# Patient Record
Sex: MALE | Race: WHITE | NOT HISPANIC OR LATINO | URBAN - METROPOLITAN AREA
[De-identification: names, ages, dates, MRNs, and addresses within clinical notes are randomized per-mention and may not be internally consistent; named-entity substitution may affect disease eponyms.]

---

## 2019-07-18 PROBLEM — Z00.00 ENCOUNTER FOR PREVENTIVE HEALTH EXAMINATION: Status: ACTIVE | Noted: 2019-07-18

## 2021-04-25 ENCOUNTER — EMERGENCY (EMERGENCY)
Facility: HOSPITAL | Age: 71
LOS: 1 days | Discharge: HOME | End: 2021-04-25
Attending: STUDENT IN AN ORGANIZED HEALTH CARE EDUCATION/TRAINING PROGRAM
Payer: MEDICARE

## 2021-04-25 VITALS — HEIGHT: 69 IN | WEIGHT: 229.94 LBS

## 2021-04-25 LAB
ALBUMIN SERPL ELPH-MCNC: 4.3 G/DL — SIGNIFICANT CHANGE UP (ref 3.3–5.2)
ALP SERPL-CCNC: 68 U/L — SIGNIFICANT CHANGE UP (ref 40–120)
ALT FLD-CCNC: 82 U/L — HIGH
ANION GAP SERPL CALC-SCNC: 17 MMOL/L — SIGNIFICANT CHANGE UP (ref 5–17)
APTT BLD: 20.7 SEC — LOW (ref 27.5–35.5)
AST SERPL-CCNC: 58 U/L — HIGH
BASOPHILS # BLD AUTO: 0.07 K/UL — SIGNIFICANT CHANGE UP (ref 0–0.2)
BASOPHILS NFR BLD AUTO: 0.9 % — SIGNIFICANT CHANGE UP (ref 0–2)
BILIRUB SERPL-MCNC: <0.2 MG/DL — LOW (ref 0.4–2)
BLD GP AB SCN SERPL QL: SIGNIFICANT CHANGE UP
BUN SERPL-MCNC: 23 MG/DL — HIGH (ref 8–20)
CALCIUM SERPL-MCNC: 9.4 MG/DL — SIGNIFICANT CHANGE UP (ref 8.6–10.2)
CHLORIDE SERPL-SCNC: 103 MMOL/L — SIGNIFICANT CHANGE UP (ref 98–107)
CO2 SERPL-SCNC: 22 MMOL/L — SIGNIFICANT CHANGE UP (ref 22–29)
CREAT SERPL-MCNC: 0.93 MG/DL — SIGNIFICANT CHANGE UP (ref 0.5–1.3)
EOSINOPHIL # BLD AUTO: 0.2 K/UL — SIGNIFICANT CHANGE UP (ref 0–0.5)
EOSINOPHIL NFR BLD AUTO: 2.5 % — SIGNIFICANT CHANGE UP (ref 0–6)
ETHANOL SERPL-MCNC: 278 MG/DL — HIGH (ref 0–9)
GLUCOSE SERPL-MCNC: 117 MG/DL — HIGH (ref 70–99)
HCT VFR BLD CALC: 46.4 % — SIGNIFICANT CHANGE UP (ref 39–50)
HGB BLD-MCNC: 15.2 G/DL — SIGNIFICANT CHANGE UP (ref 13–17)
IMM GRANULOCYTES NFR BLD AUTO: 1.9 % — HIGH (ref 0–1.5)
INR BLD: 0.96 RATIO — SIGNIFICANT CHANGE UP (ref 0.88–1.16)
LACTATE BLDV-MCNC: 2.9 MMOL/L — HIGH (ref 0.5–2)
LYMPHOCYTES # BLD AUTO: 2.7 K/UL — SIGNIFICANT CHANGE UP (ref 1–3.3)
LYMPHOCYTES # BLD AUTO: 34.4 % — SIGNIFICANT CHANGE UP (ref 13–44)
MCHC RBC-ENTMCNC: 31.9 PG — SIGNIFICANT CHANGE UP (ref 27–34)
MCHC RBC-ENTMCNC: 32.8 GM/DL — SIGNIFICANT CHANGE UP (ref 32–36)
MCV RBC AUTO: 97.3 FL — SIGNIFICANT CHANGE UP (ref 80–100)
MONOCYTES # BLD AUTO: 0.91 K/UL — HIGH (ref 0–0.9)
MONOCYTES NFR BLD AUTO: 11.6 % — SIGNIFICANT CHANGE UP (ref 2–14)
NEUTROPHILS # BLD AUTO: 3.82 K/UL — SIGNIFICANT CHANGE UP (ref 1.8–7.4)
NEUTROPHILS NFR BLD AUTO: 48.7 % — SIGNIFICANT CHANGE UP (ref 43–77)
PLATELET # BLD AUTO: 244 K/UL — SIGNIFICANT CHANGE UP (ref 150–400)
POTASSIUM SERPL-MCNC: 3.8 MMOL/L — SIGNIFICANT CHANGE UP (ref 3.5–5.3)
POTASSIUM SERPL-SCNC: 3.8 MMOL/L — SIGNIFICANT CHANGE UP (ref 3.5–5.3)
PROT SERPL-MCNC: 7.8 G/DL — SIGNIFICANT CHANGE UP (ref 6.6–8.7)
PROTHROM AB SERPL-ACNC: 11.2 SEC — SIGNIFICANT CHANGE UP (ref 10.6–13.6)
RBC # BLD: 4.77 M/UL — SIGNIFICANT CHANGE UP (ref 4.2–5.8)
RBC # FLD: 13.2 % — SIGNIFICANT CHANGE UP (ref 10.3–14.5)
SARS-COV-2 RNA SPEC QL NAA+PROBE: SIGNIFICANT CHANGE UP
SODIUM SERPL-SCNC: 141 MMOL/L — SIGNIFICANT CHANGE UP (ref 135–145)
WBC # BLD: 7.85 K/UL — SIGNIFICANT CHANGE UP (ref 3.8–10.5)
WBC # FLD AUTO: 7.85 K/UL — SIGNIFICANT CHANGE UP (ref 3.8–10.5)

## 2021-04-25 PROCEDURE — 93010 ELECTROCARDIOGRAM REPORT: CPT

## 2021-04-25 PROCEDURE — 99291 CRITICAL CARE FIRST HOUR: CPT | Mod: 25,57

## 2021-04-25 PROCEDURE — 71045 X-RAY EXAM CHEST 1 VIEW: CPT | Mod: 26

## 2021-04-25 PROCEDURE — 73090 X-RAY EXAM OF FOREARM: CPT | Mod: 26,LT

## 2021-04-25 PROCEDURE — 73060 X-RAY EXAM OF HUMERUS: CPT | Mod: 26,LT

## 2021-04-25 PROCEDURE — 73030 X-RAY EXAM OF SHOULDER: CPT | Mod: 26,LT

## 2021-04-25 PROCEDURE — 23650 CLTX SHO DSLC W/MNPJ WO ANES: CPT | Mod: 54

## 2021-04-25 RX ORDER — SODIUM CHLORIDE 9 MG/ML
2000 INJECTION INTRAMUSCULAR; INTRAVENOUS; SUBCUTANEOUS ONCE
Refills: 0 | Status: COMPLETED | OUTPATIENT
Start: 2021-04-25 | End: 2021-04-25

## 2021-04-25 RX ADMIN — SODIUM CHLORIDE 2000 MILLILITER(S): 9 INJECTION INTRAMUSCULAR; INTRAVENOUS; SUBCUTANEOUS at 23:13

## 2021-04-25 NOTE — ED PROCEDURE NOTE - NS ED PERI NEURO NEG
Post-application: Motor, sensory, and vascular responses intact in the injured extremity./The patient/caregiver verbalized understanding of how to care for the injured extremity with splint
postreduction sensation/motor improved/Post-application: Motor, sensory, and vascular responses intact in the injured extremity.

## 2021-04-25 NOTE — ED PROVIDER NOTE - NSFOLLOWUPINSTRUCTIONS_ED_ALL_ED_FT
Follow up with orthopaedic surgery this week. Maintain sling for left shoulder for 3 days and remove the sling to prevent frozen shoulder. Maintain wrist splint until follow up with orthopaedic surgery.    General info:  Shoulder Dislocation    A shoulder dislocation happens when the upper arm bone (humerus) moves out of the shoulder joint. The shoulder joint is the part of the shoulder where the humerus, shoulder blade (scapula), and collarbone (clavicle) meet.     What are the causes?  This condition is often caused by:    A fall.  A hit to the shoulder.  A forceful movement of the shoulder.    What increases the risk?  This condition is more likely to develop in people who play sports.    What are the signs or symptoms?  Symptoms of this condition include:     Deformity of the shoulder.  Intense pain.  Inability to move the shoulder.  Numbness, weakness, or tingling in your neck or down your arm.  Bruising or swelling around your shoulder.    How is this diagnosed?  This condition is diagnosed with a physical exam. After the exam, tests may be done to check for related problems. Tests that may be done include:    X-ray. This may be done to check for broken bones.  MRI. This may be done to check for damage to the tissues around the shoulder.  Electromyogram. This may be done to check for nerve damage.    How is this treated?  This condition is treated with a procedure to place the humerus back in the joint. This procedure is called a reduction. There are two types of reduction:    Closed reduction. In this procedure, the humerus is placed back in the joint without surgery. The health care provider uses his or her hands to guide the bone back into place.  Open reduction. In this procedure, the humerus is placed back in the joint with surgery. An open reduction may be recommended if:  You have a weak shoulder joint or weak ligaments.  You have had more than one shoulder dislocation.  The nerves or blood vessels around your shoulder have been damaged.    After the humerus is placed back into the joint, your arm will be placed in a splint or sling to prevent it from moving. You will need to wear the splint or sling until your shoulder heals. When the splint or sling is removed, you may have physical therapy to help improve the range of motion in your shoulder joint.    Follow these instructions at home:  If you have a splint or sling:    Wear it as told by your health care provider. Remove it only as told by your health care provider.  Loosen it if your fingers become numb and tingle, or if they turn cold and blue.  Keep it clean and dry.    Bathing    Do not take baths, swim, or use a hot tub until your health care provider approves. Ask your health care provider if you can take showers. You may only be allowed to take sponge baths for bathing.  If your health care provider approves bathing and showering, cover your splint or sling with a watertight plastic bag to protect it from water. Do not let the splint or sling get wet.    Managing pain, stiffness, and swelling    If directed, apply ice to the injured area.  Put ice in a plastic bag.  Place a towel between your skin and the bag.  Leave the ice on for 20 minutes, 2–3 times per day.  Move your fingers often to avoid stiffness and to decrease swelling.  Raise (elevate) the injured area above the level of your heart while you are sitting or lying down.     Driving    Do not drive while wearing a splint or sling on a hand that you use for driving.  Do not drive or operate heavy machinery while taking pain medicine.    Activity    Return to your normal activities as told by your health care provider. Ask your health care provider what activities are safe for you.  Perform range-of-motion exercises only as told by your health care provider.  Exercise your hand by squeezing a soft ball. This helps to decrease stiffness and swelling in your hand and wrist.    General instructions    Take over-the-counter and prescription medicines only as told by your health care provider.  Do not use any tobacco products, including cigarettes, chewing tobacco, or e-cigarettes. Tobacco can delay bone and tissue healing. If you need help quitting, ask your health care provider.  Keep all follow-up visits as told by your health care provider. This is important.    Contact a health care provider if:  Your splint or sling gets damaged.    Get help right away if:  Your pain gets worse rather than better.  You lose feeling in your arm or hand.  Your arm or hand becomes white and cold.    ADDITIONAL NOTES AND INSTRUCTIONS    Please follow up with your Primary MD in 24-48 hr.  Seek immediate medical care for any new/worsening signs or symptoms. Follow up with orthopaedic surgery this week. Maintain sling for left shoulder for 3 days and remove the sling to prevent frozen shoulder. No lifting with the left arm as the dislocation of the joint may occur again. Maintain splint until follow up with orthopaedic surgery.    General info:  Shoulder Dislocation    A shoulder dislocation happens when the upper arm bone (humerus) moves out of the shoulder joint. The shoulder joint is the part of the shoulder where the humerus, shoulder blade (scapula), and collarbone (clavicle) meet.     What are the causes?  This condition is often caused by:    A fall.  A hit to the shoulder.  A forceful movement of the shoulder.    What increases the risk?  This condition is more likely to develop in people who play sports.    What are the signs or symptoms?  Symptoms of this condition include:     Deformity of the shoulder.  Intense pain.  Inability to move the shoulder.  Numbness, weakness, or tingling in your neck or down your arm.  Bruising or swelling around your shoulder.    How is this diagnosed?  This condition is diagnosed with a physical exam. After the exam, tests may be done to check for related problems. Tests that may be done include:    X-ray. This may be done to check for broken bones.  MRI. This may be done to check for damage to the tissues around the shoulder.  Electromyogram. This may be done to check for nerve damage.    How is this treated?  This condition is treated with a procedure to place the humerus back in the joint. This procedure is called a reduction. There are two types of reduction:    Closed reduction. In this procedure, the humerus is placed back in the joint without surgery. The health care provider uses his or her hands to guide the bone back into place.  Open reduction. In this procedure, the humerus is placed back in the joint with surgery. An open reduction may be recommended if:  You have a weak shoulder joint or weak ligaments.  You have had more than one shoulder dislocation.  The nerves or blood vessels around your shoulder have been damaged.    After the humerus is placed back into the joint, your arm will be placed in a splint or sling to prevent it from moving. You will need to wear the splint or sling until your shoulder heals. When the splint or sling is removed, you may have physical therapy to help improve the range of motion in your shoulder joint.    Follow these instructions at home:  If you have a splint or sling:    Wear it as told by your health care provider. Remove it only as told by your health care provider.  Loosen it if your fingers become numb and tingle, or if they turn cold and blue.  Keep it clean and dry.    Bathing    Do not take baths, swim, or use a hot tub until your health care provider approves. Ask your health care provider if you can take showers. You may only be allowed to take sponge baths for bathing.  If your health care provider approves bathing and showering, cover your splint or sling with a watertight plastic bag to protect it from water. Do not let the splint or sling get wet.    Managing pain, stiffness, and swelling    If directed, apply ice to the injured area.  Put ice in a plastic bag.  Place a towel between your skin and the bag.  Leave the ice on for 20 minutes, 2–3 times per day.  Move your fingers often to avoid stiffness and to decrease swelling.  Raise (elevate) the injured area above the level of your heart while you are sitting or lying down.     Driving    Do not drive while wearing a splint or sling on a hand that you use for driving.  Do not drive or operate heavy machinery while taking pain medicine.    Activity    Return to your normal activities as told by your health care provider. Ask your health care provider what activities are safe for you.  Perform range-of-motion exercises only as told by your health care provider.  Exercise your hand by squeezing a soft ball. This helps to decrease stiffness and swelling in your hand and wrist.    General instructions    Take over-the-counter and prescription medicines only as told by your health care provider.  Do not use any tobacco products, including cigarettes, chewing tobacco, or e-cigarettes. Tobacco can delay bone and tissue healing. If you need help quitting, ask your health care provider.  Keep all follow-up visits as told by your health care provider. This is important.    Contact a health care provider if:  Your splint or sling gets damaged.    Get help right away if:  Your pain gets worse rather than better.  You lose feeling in your arm or hand.  Your arm or hand becomes white and cold.    ADDITIONAL NOTES AND INSTRUCTIONS    Please follow up with your Primary MD in 24-48 hr.  Seek immediate medical care for any new/worsening signs or symptoms.      Abuse of Alcohol    WHAT YOU NEED TO KNOW:    Alcohol abuse means you drink more than the recommended daily or weekly limits. You may be drinking alcohol regularly or drinking large amounts in a short period of time (binge drinking). You continue to drink even though it causes legal, work, or relationship problems.    DISCHARGE INSTRUCTIONS:    Call your local emergency number (911 in the ) for any of the following:   •You have sudden chest pain or trouble breathing.      •You want to harm yourself or others.      •You have a seizure or have shaking or trembling.      Call your doctor if:   •You have hallucinations (you see or hear things that are not real).      •You cannot stop vomiting or you vomit blood.      •You need help to stop drinking alcohol.       •You have questions or concerns about your condition or care.      Medicines:   •Vitamin supplements may be given to treat low vitamin levels. Alcohol can make it hard for your body to absorb enough vitamins such as B1. Vitamin supplements may also be given to prevent alcohol related brain damage.       •Take your medicine as directed. Contact your healthcare provider if you think your medicine is not helping or if you have side effects. Tell him or her if you are allergic to any medicine. Keep a list of the medicines, vitamins, and herbs you take. Include the amounts, and when and why you take them. Bring the list or the pill bottles to follow-up visits. Carry your medicine list with you in case of an emergency.      Health problems alcohol abuse can cause:   •Cancer in your liver, pancreas, stomach, colon, kidney, or breast      •Stroke or a heart attack      •Liver, kidney, or lung disease      •Blackouts, memory loss, brain damage, or dementia      •Diabetes, immune system problems, or thiamine (vitamin B1) deficiency      •Problems for you and your baby if you drink while pregnant      Recommended alcohol limits:   •Men 21 to 64 years should limit alcohol to 2 drinks a day. Do not have more than 4 drinks in 1 day or more than 14 in 1 week.      •All women, and men 65 or older should limit alcohol to 1 drink in a day. Do not have more than 3 drinks in 1 day or more than 7 in 1 week. No amount of alcohol is okay during pregnancy.      Manage alcohol use:   •Decrease the amount you drink. This can help prevent health problems such as brain, heart, and liver damage, high blood pressure, diabetes, and cancer. If you cannot stop completely, healthcare providers can help you set goals to decrease the amount you drink.      •Plan weekly alcohol use. You will be less likely to drink more than the recommended limit if you plan ahead.      •Have food when you drink alcohol. Food will prevent alcohol from getting into your system too quickly. Eat before you have your first alcohol drink.      •Time your drinks carefully. Have no more than 1 drink in an hour. Have a liquid such as water, coffee, or a soft drink between alcohol drinks.      •Do not drive if you have had alcohol. Make sure someone who has not been drinking can help you get home.      •Do not drink alcohol if you are taking medicine. Alcohol is dangerous when you combine it with certain medicines, such as acetaminophen or blood pressure medicine. Talk to your healthcare provider about all the medicines you currently take.      Follow up with your healthcare provider as directed: Write down your questions so you remember to ask them during your visits.    For support and more information:   •Alcoholics Anonymous  Web Address: http://www.aa.org      •Substance Abuse and Mental Health Services Administration  [address]  [place] [address]  Web Address: http://www.samhsa.gov Follow up with orthopaedic surgery this week. Maintain sling for left shoulder for 3 days and remove the sling to prevent frozen shoulder. No lifting with the left arm as the dislocation of the joint may occur again. Maintain splint until follow up with orthopaedic surgery. Take ibuprofen and/or acetaminophen for pain control     General info:  Shoulder Dislocation    A shoulder dislocation happens when the upper arm bone (humerus) moves out of the shoulder joint. The shoulder joint is the part of the shoulder where the humerus, shoulder blade (scapula), and collarbone (clavicle) meet.     What are the causes?  This condition is often caused by:    A fall.  A hit to the shoulder.  A forceful movement of the shoulder.    What increases the risk?  This condition is more likely to develop in people who play sports.    What are the signs or symptoms?  Symptoms of this condition include:     Deformity of the shoulder.  Intense pain.  Inability to move the shoulder.  Numbness, weakness, or tingling in your neck or down your arm.  Bruising or swelling around your shoulder.    How is this diagnosed?  This condition is diagnosed with a physical exam. After the exam, tests may be done to check for related problems. Tests that may be done include:    X-ray. This may be done to check for broken bones.  MRI. This may be done to check for damage to the tissues around the shoulder.  Electromyogram. This may be done to check for nerve damage.    How is this treated?  This condition is treated with a procedure to place the humerus back in the joint. This procedure is called a reduction. There are two types of reduction:    Closed reduction. In this procedure, the humerus is placed back in the joint without surgery. The health care provider uses his or her hands to guide the bone back into place.  Open reduction. In this procedure, the humerus is placed back in the joint with surgery. An open reduction may be recommended if:  You have a weak shoulder joint or weak ligaments.  You have had more than one shoulder dislocation.  The nerves or blood vessels around your shoulder have been damaged.    After the humerus is placed back into the joint, your arm will be placed in a splint or sling to prevent it from moving. You will need to wear the splint or sling until your shoulder heals. When the splint or sling is removed, you may have physical therapy to help improve the range of motion in your shoulder joint.    Follow these instructions at home:  If you have a splint or sling:    Wear it as told by your health care provider. Remove it only as told by your health care provider.  Loosen it if your fingers become numb and tingle, or if they turn cold and blue.  Keep it clean and dry.    Bathing    Do not take baths, swim, or use a hot tub until your health care provider approves. Ask your health care provider if you can take showers. You may only be allowed to take sponge baths for bathing.  If your health care provider approves bathing and showering, cover your splint or sling with a watertight plastic bag to protect it from water. Do not let the splint or sling get wet.    Managing pain, stiffness, and swelling    If directed, apply ice to the injured area.  Put ice in a plastic bag.  Place a towel between your skin and the bag.  Leave the ice on for 20 minutes, 2–3 times per day.  Move your fingers often to avoid stiffness and to decrease swelling.  Raise (elevate) the injured area above the level of your heart while you are sitting or lying down.     Driving    Do not drive while wearing a splint or sling on a hand that you use for driving.  Do not drive or operate heavy machinery while taking pain medicine.    Activity    Return to your normal activities as told by your health care provider. Ask your health care provider what activities are safe for you.  Perform range-of-motion exercises only as told by your health care provider.  Exercise your hand by squeezing a soft ball. This helps to decrease stiffness and swelling in your hand and wrist.    General instructions    Take over-the-counter and prescription medicines only as told by your health care provider.  Do not use any tobacco products, including cigarettes, chewing tobacco, or e-cigarettes. Tobacco can delay bone and tissue healing. If you need help quitting, ask your health care provider.  Keep all follow-up visits as told by your health care provider. This is important.    Contact a health care provider if:  Your splint or sling gets damaged.    Get help right away if:  Your pain gets worse rather than better.  You lose feeling in your arm or hand.  Your arm or hand becomes white and cold.    ADDITIONAL NOTES AND INSTRUCTIONS    Please follow up with your Primary MD in 24-48 hr.  Seek immediate medical care for any new/worsening signs or symptoms.      Abuse of Alcohol    WHAT YOU NEED TO KNOW:    Alcohol abuse means you drink more than the recommended daily or weekly limits. You may be drinking alcohol regularly or drinking large amounts in a short period of time (binge drinking). You continue to drink even though it causes legal, work, or relationship problems.    DISCHARGE INSTRUCTIONS:    Call your local emergency number (911 in the ) for any of the following:   •You have sudden chest pain or trouble breathing.      •You want to harm yourself or others.      •You have a seizure or have shaking or trembling.      Call your doctor if:   •You have hallucinations (you see or hear things that are not real).      •You cannot stop vomiting or you vomit blood.      •You need help to stop drinking alcohol.       •You have questions or concerns about your condition or care.      Medicines:   •Vitamin supplements may be given to treat low vitamin levels. Alcohol can make it hard for your body to absorb enough vitamins such as B1. Vitamin supplements may also be given to prevent alcohol related brain damage.       •Take your medicine as directed. Contact your healthcare provider if you think your medicine is not helping or if you have side effects. Tell him or her if you are allergic to any medicine. Keep a list of the medicines, vitamins, and herbs you take. Include the amounts, and when and why you take them. Bring the list or the pill bottles to follow-up visits. Carry your medicine list with you in case of an emergency.      Health problems alcohol abuse can cause:   •Cancer in your liver, pancreas, stomach, colon, kidney, or breast      •Stroke or a heart attack      •Liver, kidney, or lung disease      •Blackouts, memory loss, brain damage, or dementia      •Diabetes, immune system problems, or thiamine (vitamin B1) deficiency      •Problems for you and your baby if you drink while pregnant      Recommended alcohol limits:   •Men 21 to 64 years should limit alcohol to 2 drinks a day. Do not have more than 4 drinks in 1 day or more than 14 in 1 week.      •All women, and men 65 or older should limit alcohol to 1 drink in a day. Do not have more than 3 drinks in 1 day or more than 7 in 1 week. No amount of alcohol is okay during pregnancy.      Manage alcohol use:   •Decrease the amount you drink. This can help prevent health problems such as brain, heart, and liver damage, high blood pressure, diabetes, and cancer. If you cannot stop completely, healthcare providers can help you set goals to decrease the amount you drink.      •Plan weekly alcohol use. You will be less likely to drink more than the recommended limit if you plan ahead.      •Have food when you drink alcohol. Food will prevent alcohol from getting into your system too quickly. Eat before you have your first alcohol drink.      •Time your drinks carefully. Have no more than 1 drink in an hour. Have a liquid such as water, coffee, or a soft drink between alcohol drinks.      •Do not drive if you have had alcohol. Make sure someone who has not been drinking can help you get home.      •Do not drink alcohol if you are taking medicine. Alcohol is dangerous when you combine it with certain medicines, such as acetaminophen or blood pressure medicine. Talk to your healthcare provider about all the medicines you currently take.      Follow up with your healthcare provider as directed: Write down your questions so you remember to ask them during your visits.    For support and more information:   •Alcoholics Anonymous  Web Address: http://www.aa.org      •Substance Abuse and Mental Health Services Administration  [address]  [place] [address]  Web Address: http://www.samhsa.gov

## 2021-04-25 NOTE — ED PROVIDER NOTE - ENMT, MLM
Head Airway patent, Nasal mucosa clear. Mouth with normal mucosa. Throat has no vesicles, no oropharyngeal exudates and uvula is midline.

## 2021-04-25 NOTE — ED PROCEDURE NOTE - NS ED PERI VASCULAR NEG
peripheral vascular exam normal postreduction/fingers/toes warm to touch/no paresthesia/no swelling/no cyanosis of extremity/capillary refill time < 2 seconds
fingers/toes warm to touch/no paresthesia/no swelling/no cyanosis of extremity/capillary refill time < 2 seconds

## 2021-04-25 NOTE — ED PROVIDER NOTE - PATIENT PORTAL LINK FT
You can access the FollowMyHealth Patient Portal offered by [facility] by registering at the following website: [email or web address]. By joining [facility]’s FollowMyHealth portal, you will also be able to view your health information using other applications (apps) compatible with our system.

## 2021-04-25 NOTE — ED PROVIDER NOTE - CLINICAL SUMMARY MEDICAL DECISION MAKING FREE TEXT BOX
Patient with trip and fall, complaining of shoulder pain likely secondary to acute fracture/dislocation. Patient with trip and fall, complaining of shoulder pain likely secondary to acute fracture/dislocation. L. shoulder with dislocation and faint ulnar/radial pulses with decreased motor and sensation. Reduced immediately at bedside with significant improvement in circulation, pulses 2+ and return of circulation, sensation/motor improving. Labs, meds, imaging, ekg.

## 2021-04-25 NOTE — ED PROVIDER NOTE - CARE PLAN
Principal Discharge DX:	Shoulder dislocation, left, initial encounter  Secondary Diagnosis:	Alcohol intoxication

## 2021-04-25 NOTE — ED ADULT NURSE NOTE - CAS ELECT INFOMATION PROVIDED
PT verbalized understanding of discharge instructions. Refusing to wear sling or splint. PT educated as to need. Verbalized understanding. NAD noted. PT ambulated off unit in NAD/DC instructions

## 2021-04-25 NOTE — ED PROCEDURE NOTE - ATTENDING CONTRIBUTION TO CARE
Patient with acute shoulder dislocation. I personally saw the patient with the PA, and completed the key components of the history and physical exam. I then discussed the management plan with the PA. Patient with acute shoulder dislocation. I personally saw the patient with the resident, and completed the key components of the history and physical exam. I then discussed the management plan with the resident.

## 2021-04-25 NOTE — ED ADULT NURSE REASSESSMENT NOTE - NS ED NURSE REASSESS COMMENT FT1
Received pt from [facility]wo[facility] ARELLANO. PT resting in stretcher post reduction.  PT with improved circulation to left arm. C/o some numbness and tingling but sensation improved from prior.  Pt pending scans. CTM

## 2021-04-25 NOTE — ED PROVIDER NOTE - PROGRESS NOTE DETAILS
Ortho contacted for consultation. Patient shoulder reduced with significant return of pulses and patient stating sensation is improving. Will obtain repeat xray and continue to evaluate. Shelbi Diaz, Resident: continues to improve from a neurovascular standpoint, and motor/sensation. Pt with no complaints. Will continue to monitor, give IVF and await sobriety. Repeat lactate is as noted. Patient is awake, alert, oriented and continues to feel well. He has ambulated several times without need of assistance. Continue to have normal ROM of the left arm with normal pulses. feeling better. Explained management of left shoulder injury. However, patient states he can not miss work and will likely not use the sling. he is aware that the shoulder may dislocate again.

## 2021-04-25 NOTE — ED ADULT TRIAGE NOTE - CHIEF COMPLAINT QUOTE
Pt A&Ox4 states "I fell on my shoulder."  BIBA c/o deformity to left shoulder pt was drinking today and fell from standing. Patient has deformity to left shoulder, no radial pulse, no distal sensation, cool to touch and decresed cap refil. MD KERA Marks at bedside to onur.

## 2021-04-25 NOTE — ED PROCEDURE NOTE - NS ED PERI NEURO POS
prereduction sensation/motor reduced/Pre-application: Motor, sensory, and vascular responses NOT intact in the injured extremity.

## 2021-04-25 NOTE — ED PROCEDURE NOTE - CPROC ED INDICATIONS1
wrist drop on initial presentation prior to shoulder reduction, post reduction pt with motor/sensation improvement and return
left shoulder dislocation, anterior

## 2021-04-25 NOTE — ED ADULT NURSE REASSESSMENT NOTE - NS ED NURSE REASSESS COMMENT FT1
MD Diaz and ADRIEN Goldman at patients bedside.  L shoulder reduced by MD.  +color change and + radial pulses.  Decreased sensation to left arm.  Pending repeat xrays.

## 2021-04-25 NOTE — ED PROVIDER NOTE - ATTENDING CONTRIBUTION TO CARE
71 yo male with acute shoulder injury with decrease pulse and movement secondary to trip and fall at home. Patient with grossly normal examination except obvious shoulder deformity with decrease ROM of left arm, wrist, fingers with significantly decrease pulses to the left wrist compared to the right. I personally saw the patient with the resident, and completed the key components of the history and physical exam. I then discussed the management plan with the resident.     Due to the a high probability of clinically significant, life threatening deterioration, the patient required high level of preparedness to intervene emergently. I personally spent critical care time directly and personally managing the patient. This included (but not limited too reviewing  vitals, managing orders, and determining and adjusting plan depending on response to interventions.

## 2021-04-25 NOTE — ED PROVIDER NOTE - OBJECTIVE STATEMENT
69 y/o male with no PMHx presents to ED s/p fall. As per EMS, patient fell landing on his left shoulder, patient was drinking alcohol tonight, is a daily alcohol drinker. EMS reports they were unable to palpate a radial pulse on the left arm. Patient reports left shoulder pain. Patient reports he called [id number] because he was unable to feel his arm.

## 2021-04-25 NOTE — ED PROVIDER NOTE - CARE PROVIDER_API CALL
Adarsh Sykes (DO)  Orthopedics  [address] [place] [id number]  Phone: [phone]  Fax: [phone]  Follow Up Time: 4-6 Days

## 2021-04-25 NOTE — ED PROVIDER NOTE - MUSCULOSKELETAL, MLM
(+) deformity to left shoulder with pain. Spine appears normal, range of motion to all other extremities is normal, no muscle or joint tenderness

## 2021-04-26 VITALS
RESPIRATION RATE: 17 BRPM | SYSTOLIC BLOOD PRESSURE: 98 MMHG | TEMPERATURE: 99 F | OXYGEN SATURATION: 94 % | HEART RATE: 87 BPM | DIASTOLIC BLOOD PRESSURE: 65 MMHG

## 2021-04-26 LAB — LACTATE BLDV-MCNC: 1.8 MMOL/L — SIGNIFICANT CHANGE UP (ref 0.5–2)

## 2021-04-26 PROCEDURE — 70450 CT HEAD/BRAIN W/O DYE: CPT

## 2021-04-26 PROCEDURE — U0003: CPT

## 2021-04-26 PROCEDURE — 72125 CT NECK SPINE W/O DYE: CPT | Mod: 26

## 2021-04-26 PROCEDURE — 83605 ASSAY OF LACTIC ACID: CPT

## 2021-04-26 PROCEDURE — 93005 ELECTROCARDIOGRAM TRACING: CPT

## 2021-04-26 PROCEDURE — 80053 COMPREHEN METABOLIC PANEL: CPT

## 2021-04-26 PROCEDURE — 86850 RBC ANTIBODY SCREEN: CPT

## 2021-04-26 PROCEDURE — 85025 COMPLETE CBC W/AUTO DIFF WBC: CPT

## 2021-04-26 PROCEDURE — 86901 BLOOD TYPING SEROLOGIC RH(D): CPT

## 2021-04-26 PROCEDURE — 73090 X-RAY EXAM OF FOREARM: CPT

## 2021-04-26 PROCEDURE — 72125 CT NECK SPINE W/O DYE: CPT

## 2021-04-26 PROCEDURE — 71045 X-RAY EXAM CHEST 1 VIEW: CPT

## 2021-04-26 PROCEDURE — 85730 THROMBOPLASTIN TIME PARTIAL: CPT

## 2021-04-26 PROCEDURE — 36415 COLL VENOUS BLD VENIPUNCTURE: CPT

## 2021-04-26 PROCEDURE — 73030 X-RAY EXAM OF SHOULDER: CPT

## 2021-04-26 PROCEDURE — 73060 X-RAY EXAM OF HUMERUS: CPT

## 2021-04-26 PROCEDURE — U0005: CPT

## 2021-04-26 PROCEDURE — 99291 CRITICAL CARE FIRST HOUR: CPT | Mod: 25

## 2021-04-26 PROCEDURE — 70450 CT HEAD/BRAIN W/O DYE: CPT | Mod: 26

## 2021-04-26 PROCEDURE — 80307 DRUG TEST PRSMV CHEM ANLYZR: CPT

## 2021-04-26 PROCEDURE — 86900 BLOOD TYPING SEROLOGIC ABO: CPT

## 2021-04-26 PROCEDURE — 85610 PROTHROMBIN TIME: CPT

## 2021-04-26 PROCEDURE — 23650 CLTX SHO DSLC W/MNPJ WO ANES: CPT | Mod: LT

## 2021-04-26 NOTE — ED ADULT NURSE REASSESSMENT NOTE - NS ED NURSE REASSESS COMMENT FT1
PT removed left arm brace and sling. PT moving extremity. PT made aware of need for brace and sling. PT refusing to replace. MD Marks aware.

## 2021-08-12 NOTE — ED PROCEDURE NOTE - ATTENDING CONTRIBUTION TO CARE
Patient tolerated procedure well. Patient with acute shoulder dislocation. I personally saw the patient with the resident, and completed the key components of the history and physical exam. I then discussed the management plan with the resident.

## 2024-08-12 NOTE — ED PROVIDER NOTE - PRINCIPAL DIAGNOSIS
Plan: Discussed shave excision, scar, and wound care.  Pt declined shave excision and elected to monitor area for pigment recurrence. 
Detail Level: Zone
Shoulder dislocation, left, initial encounter